# Patient Record
Sex: MALE | URBAN - METROPOLITAN AREA
[De-identification: names, ages, dates, MRNs, and addresses within clinical notes are randomized per-mention and may not be internally consistent; named-entity substitution may affect disease eponyms.]

---

## 2022-11-01 ENCOUNTER — OFFICE VISIT (OUTPATIENT)
Dept: URBAN - METROPOLITAN AREA CLINIC 8 | Facility: CLINIC | Age: 67
End: 2022-11-01
Payer: COMMERCIAL

## 2022-11-01 DIAGNOSIS — H35.3111 NONEXUDATIVE AGE-RELATED MACULAR DEGENERATION OF RT EYE, EARLY DRY STAGE: ICD-10-CM

## 2022-11-01 DIAGNOSIS — H17.89 OTHER CORNEAL OPACITIES: ICD-10-CM

## 2022-11-01 DIAGNOSIS — H25.813 COMBINED FORMS OF AGE-RELATED CATARACT, BILATERAL: Primary | ICD-10-CM

## 2022-11-01 PROCEDURE — 99204 OFFICE O/P NEW MOD 45 MIN: CPT | Performed by: OPHTHALMOLOGY

## 2022-11-01 PROCEDURE — 92136 OPHTHALMIC BIOMETRY: CPT | Performed by: OPHTHALMOLOGY

## 2022-11-01 PROCEDURE — 92134 CPTRZ OPH DX IMG PST SGM RTA: CPT | Performed by: OPHTHALMOLOGY

## 2022-11-01 RX ORDER — PREDNISOLONE ACETATE 10 MG/ML
1 % SUSPENSION/ DROPS OPHTHALMIC
Qty: 15 | Refills: 1 | Status: ACTIVE
Start: 2022-11-01

## 2022-11-01 RX ORDER — OFLOXACIN 3 MG/ML
0.3 % SOLUTION/ DROPS OPHTHALMIC
Qty: 5 | Refills: 1 | Status: ACTIVE
Start: 2022-11-01

## 2022-11-01 ASSESSMENT — VISUAL ACUITY
OD: 20/25
OS: 20/25

## 2022-11-01 ASSESSMENT — PACHYMETRY
OS: 24.06
OS: 2.70
OD: 24.02
OD: 2.67

## 2022-11-01 ASSESSMENT — INTRAOCULAR PRESSURE
OS: 17
OD: 18

## 2022-11-01 NOTE — IMPRESSION/PLAN
Impression: Combined forms of age-related cataract, bilateral: H25.813. Plan: Discussed cataract surgery diagnosis with patient. Discussed risk and benefits Including infection, retinal detachment, droopy eye lid, swelling, bleeding, loss of vision and double vision. Patient understands will still need Rx glasses for best corrected vision. Recommend OS first then OD
OD AIM: plano          OS AIM: plano Candidate for LenSx, ORA and multifocal IOL, if pt wishes Patient elects standard surgery with standard IOL 
ERx prednisolone and ofloxacin to pharmacy today IOL master, Verion, Jet and OCT were completed today.

## 2022-11-01 NOTE — IMPRESSION/PLAN
Impression: Other corneal opacities: H17.89.  Plan: Central corneal scar OU
stable, continue to monitor

## 2022-11-01 NOTE — IMPRESSION/PLAN
Impression: Nonexudative age-related macular degeneration of rt eye, early dry stage: H35.3111. Plan: AMD, Dry- The patient has dry age-related macular degeneration (AMD). Explained to the patient that currently there is no retinal treatment for dry AMD at this time. I recommend the patient take the AREDS formula anti-oxidant vitamins and to continue weekly self-monitoring for progression with the amsler grid.